# Patient Record
Sex: FEMALE | Race: WHITE | NOT HISPANIC OR LATINO | URBAN - METROPOLITAN AREA
[De-identification: names, ages, dates, MRNs, and addresses within clinical notes are randomized per-mention and may not be internally consistent; named-entity substitution may affect disease eponyms.]

---

## 2017-01-23 ENCOUNTER — OUTPATIENT (OUTPATIENT)
Dept: OUTPATIENT SERVICES | Facility: HOSPITAL | Age: 37
LOS: 1 days | End: 2017-01-23
Payer: COMMERCIAL

## 2017-01-23 PROCEDURE — 88321 CONSLTJ&REPRT SLD PREP ELSWR: CPT

## 2017-01-24 DIAGNOSIS — Z71.9 COUNSELING, UNSPECIFIED: ICD-10-CM

## 2017-01-24 LAB — PATHOLOGY CONSULT NOTE: SIGNIFICANT CHANGE UP

## 2017-12-11 ENCOUNTER — APPOINTMENT (OUTPATIENT)
Dept: DERMATOLOGY | Facility: CLINIC | Age: 37
End: 2017-12-11
Payer: COMMERCIAL

## 2017-12-11 PROCEDURE — 99203 OFFICE O/P NEW LOW 30 MIN: CPT

## 2017-12-23 ENCOUNTER — TRANSCRIPTION ENCOUNTER (OUTPATIENT)
Age: 37
End: 2017-12-23

## 2017-12-29 ENCOUNTER — TRANSCRIPTION ENCOUNTER (OUTPATIENT)
Age: 37
End: 2017-12-29

## 2018-12-12 ENCOUNTER — RESULT REVIEW (OUTPATIENT)
Age: 38
End: 2018-12-12

## 2018-12-12 ENCOUNTER — APPOINTMENT (OUTPATIENT)
Dept: DERMATOLOGY | Facility: CLINIC | Age: 38
End: 2018-12-12
Payer: COMMERCIAL

## 2018-12-12 PROCEDURE — 11100 BX SKIN SUBCUTANEOUS&/MUCOUS MEMBRANE 1 LESION: CPT

## 2018-12-12 PROCEDURE — 99213 OFFICE O/P EST LOW 20 MIN: CPT | Mod: 25

## 2019-10-17 ENCOUNTER — APPOINTMENT (OUTPATIENT)
Dept: PEDIATRIC CARDIOLOGY | Facility: CLINIC | Age: 39
End: 2019-10-17
Payer: COMMERCIAL

## 2019-10-17 PROCEDURE — 76820 UMBILICAL ARTERY ECHO: CPT

## 2019-10-17 PROCEDURE — 99203 OFFICE O/P NEW LOW 30 MIN: CPT | Mod: 25

## 2019-10-17 PROCEDURE — 93325 DOPPLER ECHO COLOR FLOW MAPG: CPT | Mod: 59

## 2019-10-17 PROCEDURE — 76825 ECHO EXAM OF FETAL HEART: CPT

## 2019-10-17 PROCEDURE — 76821 MIDDLE CEREBRAL ARTERY ECHO: CPT

## 2019-10-17 PROCEDURE — 76827 ECHO EXAM OF FETAL HEART: CPT

## 2019-12-11 ENCOUNTER — APPOINTMENT (OUTPATIENT)
Dept: DERMATOLOGY | Facility: CLINIC | Age: 39
End: 2019-12-11
Payer: COMMERCIAL

## 2019-12-11 PROCEDURE — 99214 OFFICE O/P EST MOD 30 MIN: CPT

## 2020-01-14 ENCOUNTER — OUTPATIENT (OUTPATIENT)
Dept: OUTPATIENT SERVICES | Facility: HOSPITAL | Age: 40
LOS: 1 days | End: 2020-01-14
Payer: COMMERCIAL

## 2020-01-14 VITALS
WEIGHT: 136.69 LBS | HEART RATE: 72 BPM | RESPIRATION RATE: 20 BRPM | HEIGHT: 71 IN | SYSTOLIC BLOOD PRESSURE: 90 MMHG | DIASTOLIC BLOOD PRESSURE: 54 MMHG | TEMPERATURE: 98 F

## 2020-01-14 DIAGNOSIS — Z01.818 ENCOUNTER FOR OTHER PREPROCEDURAL EXAMINATION: ICD-10-CM

## 2020-01-14 LAB
APPEARANCE UR: CLEAR — SIGNIFICANT CHANGE UP
BACTERIA # UR AUTO: ABNORMAL
BASOPHILS # BLD AUTO: 0.03 K/UL — SIGNIFICANT CHANGE UP (ref 0–0.2)
BASOPHILS NFR BLD AUTO: 0.3 % — SIGNIFICANT CHANGE UP (ref 0–2)
BILIRUB UR-MCNC: NEGATIVE — SIGNIFICANT CHANGE UP
BLD GP AB SCN SERPL QL: SIGNIFICANT CHANGE UP
COLOR SPEC: YELLOW — SIGNIFICANT CHANGE UP
DIFF PNL FLD: NEGATIVE — SIGNIFICANT CHANGE UP
EOSINOPHIL # BLD AUTO: 0.05 K/UL — SIGNIFICANT CHANGE UP (ref 0–0.5)
EOSINOPHIL NFR BLD AUTO: 0.5 % — SIGNIFICANT CHANGE UP (ref 0–6)
EPI CELLS # UR: ABNORMAL
GLUCOSE UR QL: 50 MG/DL
HCT VFR BLD CALC: 35.4 % — SIGNIFICANT CHANGE UP (ref 34.5–45)
HGB BLD-MCNC: 11.6 G/DL — SIGNIFICANT CHANGE UP (ref 11.5–15.5)
IMM GRANULOCYTES NFR BLD AUTO: 0.6 % — SIGNIFICANT CHANGE UP (ref 0–1.5)
KETONES UR-MCNC: NEGATIVE — SIGNIFICANT CHANGE UP
LEUKOCYTE ESTERASE UR-ACNC: ABNORMAL
LYMPHOCYTES # BLD AUTO: 1.23 K/UL — SIGNIFICANT CHANGE UP (ref 1–3.3)
LYMPHOCYTES # BLD AUTO: 12.5 % — LOW (ref 13–44)
MCHC RBC-ENTMCNC: 30.6 PG — SIGNIFICANT CHANGE UP (ref 27–34)
MCHC RBC-ENTMCNC: 32.8 GM/DL — SIGNIFICANT CHANGE UP (ref 32–36)
MCV RBC AUTO: 93.4 FL — SIGNIFICANT CHANGE UP (ref 80–100)
MONOCYTES # BLD AUTO: 0.71 K/UL — SIGNIFICANT CHANGE UP (ref 0–0.9)
MONOCYTES NFR BLD AUTO: 7.2 % — SIGNIFICANT CHANGE UP (ref 2–14)
NEUTROPHILS # BLD AUTO: 7.74 K/UL — HIGH (ref 1.8–7.4)
NEUTROPHILS NFR BLD AUTO: 78.9 % — HIGH (ref 43–77)
NITRITE UR-MCNC: NEGATIVE — SIGNIFICANT CHANGE UP
PH UR: 6.5 — SIGNIFICANT CHANGE UP (ref 5–8)
PLATELET # BLD AUTO: 230 K/UL — SIGNIFICANT CHANGE UP (ref 150–400)
PROT UR-MCNC: 15 MG/DL
RBC # BLD: 3.79 M/UL — LOW (ref 3.8–5.2)
RBC # FLD: 12.1 % — SIGNIFICANT CHANGE UP (ref 10.3–14.5)
RBC CASTS # UR COMP ASSIST: SIGNIFICANT CHANGE UP /HPF (ref 0–4)
SP GR SPEC: 1.02 — SIGNIFICANT CHANGE UP (ref 1.01–1.02)
UROBILINOGEN FLD QL: NEGATIVE MG/DL — SIGNIFICANT CHANGE UP
WBC # BLD: 9.82 K/UL — SIGNIFICANT CHANGE UP (ref 3.8–10.5)
WBC # FLD AUTO: 9.82 K/UL — SIGNIFICANT CHANGE UP (ref 3.8–10.5)
WBC UR QL: SIGNIFICANT CHANGE UP

## 2020-01-14 PROCEDURE — G0463: CPT

## 2020-01-15 LAB — T PALLIDUM AB TITR SER: NEGATIVE — SIGNIFICANT CHANGE UP

## 2020-01-25 ENCOUNTER — TRANSCRIPTION ENCOUNTER (OUTPATIENT)
Age: 40
End: 2020-01-25

## 2020-01-25 ENCOUNTER — RESULT REVIEW (OUTPATIENT)
Age: 40
End: 2020-01-25

## 2020-01-25 RX ORDER — OXYTOCIN 10 UNIT/ML
333.33 VIAL (ML) INJECTION
Qty: 20 | Refills: 0 | Status: DISCONTINUED | OUTPATIENT
Start: 2020-01-26 | End: 2020-01-26

## 2020-01-25 RX ORDER — METOCLOPRAMIDE HCL 10 MG
10 TABLET ORAL ONCE
Refills: 0 | Status: DISCONTINUED | OUTPATIENT
Start: 2020-01-26 | End: 2020-01-26

## 2020-01-25 RX ORDER — SODIUM CHLORIDE 9 MG/ML
1000 INJECTION, SOLUTION INTRAVENOUS
Refills: 0 | Status: DISCONTINUED | OUTPATIENT
Start: 2020-01-26 | End: 2020-01-26

## 2020-01-25 NOTE — OB PROVIDER H&P - NSHPPHYSICALEXAM_GEN_ALL_CORE
ICU Vital Signs Last 24 Hrs  T(C): 36.8 (26 Jan 2020 08:03), Max: 36.8 (26 Jan 2020 08:01)  T(F): 98.2 (26 Jan 2020 08:03), Max: 98.24 (26 Jan 2020 08:01)  HR: 65 (26 Jan 2020 08:03) (65 - 65)  BP: 115/74 (26 Jan 2020 08:03) (115/74 - 115/74)  RR: 16 (26 Jan 2020 08:03) (16 - 16)          General: AOx3, NAD  Heart: RRR  Lungs: CTAB  Abd: Soft, nontender, gravid    BMI (kg/m2): 23.4 (01-26-20 @ 08:03)        FHT: 140 bpm, moderate variability + accels no decelerations   Railroad: Ctxs every 6-10 minutes.     Sono: Breech

## 2020-01-25 NOTE — OB PROVIDER H&P - ASSESSMENT
37yo  at 39w3d c/w LMP who presents to L&D for a primary CS for breech presentation.   - consent  - admission labs  - preop antibiotics     d/w Dr. Joseph SUSAN GREENBERG is a 39y  at 39w3d c/w LMP who presents to L&D for a primary CS for breech presentation.     PLAN:  - Consent  - Admission labs  - GBS Negative  - Breech presentation  - Pre-operative antibiotics ordered  - Continuous toco/FHT  - Maternal/fetal status reassuring  - Admit to L&D    D/w Dr. Joseph

## 2020-01-25 NOTE — OB PROVIDER H&P - HISTORY OF PRESENT ILLNESS
37yo  at 39w3d c/w LMP who presents to L&D for a primary CS for breech presentation  AINSLEY: 20  LMP: 19  Prenatal course complicated by: AMA    OBHx:   G1: 10/18 [id number]   PMH: ***  PSH: laparoscopy   Meds: ***  ALL: nkda 39yo  at 39w3d c/w LMP who presents to L&D for a primary CS for breech presentation    No acute complaints at this time. Reports good fetal movement. Denies any vaginal bleeding or contraction like pain    AINSLEY: 20  LMP: 19  Prenatal course complicated by:   -AMA  -Breech presentation  -Marginal cord insertion    OBHx:   - G1: 10/18 [id number] -   PMH: Dermoid cyst, Fibroid  PSH: Exploratory laparoscopy   Meds: PNV  ALL: nkda

## 2020-01-26 ENCOUNTER — RESULT REVIEW (OUTPATIENT)
Age: 40
End: 2020-01-26

## 2020-01-26 ENCOUNTER — TRANSCRIPTION ENCOUNTER (OUTPATIENT)
Age: 40
End: 2020-01-26

## 2020-01-26 ENCOUNTER — INPATIENT (INPATIENT)
Facility: HOSPITAL | Age: 40
LOS: 1 days | Discharge: HOME | End: 2020-01-28
Attending: OBSTETRICS & GYNECOLOGY | Admitting: OBSTETRICS & GYNECOLOGY
Payer: COMMERCIAL

## 2020-01-26 VITALS — DIASTOLIC BLOOD PRESSURE: 74 MMHG | SYSTOLIC BLOOD PRESSURE: 115 MMHG | HEART RATE: 65 BPM

## 2020-01-26 LAB
BLD GP AB SCN SERPL QL: SIGNIFICANT CHANGE UP
T PALLIDUM AB TITR SER: NEGATIVE — SIGNIFICANT CHANGE UP

## 2020-01-26 PROCEDURE — 88307 TISSUE EXAM BY PATHOLOGIST: CPT | Mod: 26

## 2020-01-26 PROCEDURE — 88112 CYTOPATH CELL ENHANCE TECH: CPT | Mod: 26

## 2020-01-26 PROCEDURE — 88302 TISSUE EXAM BY PATHOLOGIST: CPT | Mod: 26

## 2020-01-26 RX ORDER — CITRIC ACID/SODIUM CITRATE 300-500 MG
30 SOLUTION, ORAL ORAL ONCE
Refills: 0 | Status: COMPLETED | OUTPATIENT
Start: 2020-01-26 | End: 2020-01-26

## 2020-01-26 RX ORDER — ONDANSETRON 8 MG/1
4 TABLET, FILM COATED ORAL EVERY 6 HOURS
Refills: 0 | Status: DISCONTINUED | OUTPATIENT
Start: 2020-01-26 | End: 2020-01-28

## 2020-01-26 RX ORDER — MAGNESIUM HYDROXIDE 400 MG/1
30 TABLET, CHEWABLE ORAL
Refills: 0 | Status: DISCONTINUED | OUTPATIENT
Start: 2020-01-26 | End: 2020-01-28

## 2020-01-26 RX ORDER — OXYCODONE HYDROCHLORIDE 5 MG/1
5 TABLET ORAL
Refills: 0 | Status: COMPLETED | OUTPATIENT
Start: 2020-01-26 | End: 2020-02-02

## 2020-01-26 RX ORDER — SODIUM CHLORIDE 9 MG/ML
1000 INJECTION, SOLUTION INTRAVENOUS
Refills: 0 | Status: DISCONTINUED | OUTPATIENT
Start: 2020-01-26 | End: 2020-01-28

## 2020-01-26 RX ORDER — TETANUS TOXOID, REDUCED DIPHTHERIA TOXOID AND ACELLULAR PERTUSSIS VACCINE, ADSORBED 5; 2.5; 8; 8; 2.5 [IU]/.5ML; [IU]/.5ML; UG/.5ML; UG/.5ML; UG/.5ML
0.5 SUSPENSION INTRAMUSCULAR ONCE
Refills: 0 | Status: DISCONTINUED | OUTPATIENT
Start: 2020-01-26 | End: 2020-01-28

## 2020-01-26 RX ORDER — GLYCERIN ADULT
1 SUPPOSITORY, RECTAL RECTAL AT BEDTIME
Refills: 0 | Status: COMPLETED | OUTPATIENT
Start: 2020-01-26 | End: 2020-01-27

## 2020-01-26 RX ORDER — OXYTOCIN 10 UNIT/ML
333.33 VIAL (ML) INJECTION
Qty: 20 | Refills: 0 | Status: DISCONTINUED | OUTPATIENT
Start: 2020-01-26 | End: 2020-01-28

## 2020-01-26 RX ORDER — FAMOTIDINE 10 MG/ML
20 INJECTION INTRAVENOUS ONCE
Refills: 0 | Status: COMPLETED | OUTPATIENT
Start: 2020-01-26 | End: 2020-01-26

## 2020-01-26 RX ORDER — KETOROLAC TROMETHAMINE 30 MG/ML
30 SYRINGE (ML) INJECTION EVERY 6 HOURS
Refills: 0 | Status: DISCONTINUED | OUTPATIENT
Start: 2020-01-26 | End: 2020-01-27

## 2020-01-26 RX ORDER — IBUPROFEN 200 MG
600 TABLET ORAL EVERY 6 HOURS
Refills: 0 | Status: COMPLETED | OUTPATIENT
Start: 2020-01-26 | End: 2020-12-24

## 2020-01-26 RX ORDER — SODIUM CHLORIDE 9 MG/ML
1000 INJECTION, SOLUTION INTRAVENOUS ONCE
Refills: 0 | Status: COMPLETED | OUTPATIENT
Start: 2020-01-26 | End: 2020-01-26

## 2020-01-26 RX ORDER — LANOLIN
1 OINTMENT (GRAM) TOPICAL EVERY 6 HOURS
Refills: 0 | Status: DISCONTINUED | OUTPATIENT
Start: 2020-01-26 | End: 2020-01-28

## 2020-01-26 RX ORDER — ACETAMINOPHEN 500 MG
975 TABLET ORAL
Refills: 0 | Status: DISCONTINUED | OUTPATIENT
Start: 2020-01-26 | End: 2020-01-28

## 2020-01-26 RX ORDER — DIPHENHYDRAMINE HCL 50 MG
25 CAPSULE ORAL EVERY 6 HOURS
Refills: 0 | Status: DISCONTINUED | OUTPATIENT
Start: 2020-01-26 | End: 2020-01-28

## 2020-01-26 RX ORDER — SIMETHICONE 80 MG/1
80 TABLET, CHEWABLE ORAL EVERY 4 HOURS
Refills: 0 | Status: DISCONTINUED | OUTPATIENT
Start: 2020-01-26 | End: 2020-01-28

## 2020-01-26 RX ORDER — ACETAMINOPHEN 500 MG
1000 TABLET ORAL ONCE
Refills: 0 | Status: COMPLETED | OUTPATIENT
Start: 2020-01-26 | End: 2020-01-26

## 2020-01-26 RX ORDER — CEFAZOLIN SODIUM 1 G
2000 VIAL (EA) INJECTION ONCE
Refills: 0 | Status: COMPLETED | OUTPATIENT
Start: 2020-01-26 | End: 2020-01-26

## 2020-01-26 RX ORDER — OXYCODONE HYDROCHLORIDE 5 MG/1
5 TABLET ORAL ONCE
Refills: 0 | Status: DISCONTINUED | OUTPATIENT
Start: 2020-01-26 | End: 2020-01-28

## 2020-01-26 RX ADMIN — Medication 30 MILLILITER(S): at 09:31

## 2020-01-26 RX ADMIN — Medication 30 MILLIGRAM(S): at 19:06

## 2020-01-26 RX ADMIN — Medication 30 MILLIGRAM(S): at 13:15

## 2020-01-26 RX ADMIN — Medication 975 MILLIGRAM(S): at 22:34

## 2020-01-26 RX ADMIN — SODIUM CHLORIDE 125 MILLILITER(S): 9 INJECTION, SOLUTION INTRAVENOUS at 09:29

## 2020-01-26 RX ADMIN — SODIUM CHLORIDE 2000 MILLILITER(S): 9 INJECTION, SOLUTION INTRAVENOUS at 08:25

## 2020-01-26 RX ADMIN — FAMOTIDINE 20 MILLIGRAM(S): 10 INJECTION INTRAVENOUS at 09:32

## 2020-01-26 RX ADMIN — Medication 30 MILLIGRAM(S): at 13:00

## 2020-01-26 RX ADMIN — Medication 100 MILLIGRAM(S): at 10:05

## 2020-01-26 RX ADMIN — Medication 975 MILLIGRAM(S): at 23:30

## 2020-01-26 RX ADMIN — Medication 1000 MILLIUNIT(S)/MIN: at 10:26

## 2020-01-26 RX ADMIN — Medication 1000 MILLIGRAM(S): at 14:00

## 2020-01-26 RX ADMIN — Medication 400 MILLIGRAM(S): at 13:30

## 2020-01-26 RX ADMIN — Medication 30 MILLIGRAM(S): at 19:26

## 2020-01-26 NOTE — DISCHARGE NOTE OB - CARE PROVIDER_API CALL
Mary Beth Joseph)  Obstetrics and Gynecology  [address] [place] [address]  Phone: [phone]  Fax: [phone]  Follow Up Time:

## 2020-01-26 NOTE — DISCHARGE NOTE OB - PLAN OF CARE
Healthy mother and baby 1) Please take ibuprofen and other prescribed medications as needed for pain.  2) Nothing in the vagina for 6 weeks (including no sex, no tampons, and no douching).  3) No tub baths or pools for 2 weeks. Showers are okay. Please keep your incision dry until your follow up appointment.  4) Please call your doctor for a follow up appointment  5) Please call the office sooner if you have heavy vaginal bleeding, severe abdominal pain, or fever over 100.4F.

## 2020-01-26 NOTE — OB PROVIDER DELIVERY SUMMARY - NSPROVIDERDELIVERYNOTE_OBGYN_ALL_OB_FT
39yyo  @ 39weeks presenting with primary  section for breech presentation    Patient was taken to the OR, a primary low transverse  section was performed and a viable female infant was delivered atraumatically in sky presentation at 10:25. Nuchal cord x 0. Placenta delivered Intact,    Left 2cm ovarian cyst was noted and incision with scalpel. Left fallopian tube was grasped with babock and ligated with ligasure. Right fallopian tube ligated in a similar manner.     Hysterotomy, rectus muscles, fascia, and skin were reapproximated with suture. Excellent hemostasis was obtained at each layer of closure.  Apgars 9/9  EBL 500cc 39yyo  @ 39weeks presenting with primary  section for breech presentation    Patient was taken to the OR, a primary low transverse  section was performed and a viable female infant was delivered atraumatically in sky breech presentation at 10:25. Nuchal cord x 0. Placenta delivered Intact at 10:26. Hysterotomy was repaired in a running locked fashion    Left 2cm ovarian cyst was noted and incised with scalpel. Scissors were used to develop the plan between the cyst wall and the ovarian cortex to shell out the cyst. Cyst was removed, and ovarian cortex was repaired in a running fashion. Left fallopian tube was grasped with Lanie and ligated with ligasure. Right fallopian tube ligated in a similar manner.     Rectus muscles, fascia, and skin were reapproximated with suture. Excellent hemostasis was obtained at each layer of closure.  Apgars 9/9  EBL 500cc

## 2020-01-26 NOTE — DISCHARGE NOTE OB - PATIENT PORTAL LINK FT
You can access the FollowMyHealth Patient Portal offered by [facility] by registering at the following website: [email or web address]. By joining [facility]’s FollowMyHealth portal, you will also be able to view your health information using other applications (apps) compatible with our system.

## 2020-01-26 NOTE — DISCHARGE NOTE OB - HOSPITAL COURSE
POD#3 s/p rCS of a viable female infant. Patient transferred to post partum unit, uncomplicated hospital course. At the time of discharge patient was tolerating regular diet PO, ambulating, voiding, and demonstrating bowel function. Pain well controlled with pain medications PRN.

## 2020-01-26 NOTE — DISCHARGE NOTE OB - MEDICATION SUMMARY - MEDICATIONS TO TAKE
I will START or STAY ON the medications listed below when I get home from the hospital:    ibuprofen 600 mg oral tablet  -- 1 tab(s) by mouth every 6 hours, As Needed -for mild pain   -- Do not take this drug if you are pregnant.  It is very important that you take or use this exactly as directed.  Do not skip doses or discontinue unless directed by your doctor.  May cause drowsiness or dizziness.  Obtain medical advice before taking any non-prescription drugs as some may affect the action of this medication.  Take with food or milk.    -- Indication: For  delivery delivered

## 2020-01-26 NOTE — OB NEONATOLOGY/PEDIATRICIAN DELIVERY SUMMARY - NSPEDSNEONOTESA_OBGYN_ALL_OB_FT
Requested by DR. Joseph to attend a scheduled P C/S  due to breech presentation of a 38 y/o  mom at 39.3 weeks GA.  She had + PNC, is O pos, HIV neg, HBsAg neg, RPR NR, Rubella Imm, GBS neg.  L&D:  AROM at delivery.  Baby born breech presentation good cry and respiratory effort. Transfer to warmer dry and stimulated. Physical exam done showed to FOB. Full tern female BW  g AGA. To mother for STS then to  RN for transition under Hospitalist. Requested by DR. Joseph to attend a scheduled P C/S  due to breech presentation of a 40 y/o  mom at 39.3 weeks GA.  She had + PNC, is O pos, HIV neg, HBsAg neg, RPR NR, Rubella Imm, GBS neg.  L&D:  AROM at delivery.  Baby born sky breech presentation good cry and respiratory effort. Transfer to warmer dry and stimulated. Physical exam done showed to FOB. Full tern female BW 3690  g AGA. To mother for STS then to  RN for transition under Hospitalist. Requested by DR. Joseph to attend a scheduled P C/S  due to breech presentation of a 40 y/o  mom at 39.3 weeks GA.  She had + PNC, is O pos, HIV neg, HBsAg neg, RPR NR, Rubella Imm, GBS neg.  L&D:  AROM at delivery.  Baby born sky breech presentation good cry and respiratory effort. Transfer to warmer dry and stimulated. Physical exam done showed to FOB. Full tern female BW 3690  g AGA. To mother for STS then to  RN for transition under Hospitalist. Needs HIP sonogram at 4-6 wks.

## 2020-01-26 NOTE — DISCHARGE NOTE OB - MEDICATION SUMMARY - MEDICATIONS TO STOP TAKING
I will STOP taking the medications listed below when I get home from the hospital:    motrin  -- 600 milligram(s) by mouth every 6 hours, As Needed -for moderate pain MDD:4 tabs    Percocet 5/325 325 mg-5 mg oral tablet  -- 1 tab(s) by mouth every 6 hours, As Needed -for severe pain MDD:4 tabs  -- Caution federal law prohibits the transfer of this drug to any person other  than the person for whom it was prescribed.  May cause drowsiness.  Alcohol may intensify this effect.  Use care when operating dangerous machinery.  This prescription cannot be refilled.  This product contains acetaminophen.  Do not use  with any other product containing acetaminophen to prevent possible liver damage.  Using more of this medication than prescribed may cause serious breathing problems.    prenatal vitamin  -- once a day    Folic acid  -- once a day    Vitamin D  -- once a day

## 2020-01-26 NOTE — OB RN DELIVERY SUMMARY - NS_SEPSISRSKCALC_OBGYN_ALL_OB_FT
EOS calculated successfully. EOS Risk Factor: 0.5/1000 live births ([facility] national incidence); GA=39w3d; Temp=98.24; ROM=0.033; GBS='Negative'; Antibiotics='No antibiotics or any antibiotics < 2 hrs prior to birth'

## 2020-01-26 NOTE — DISCHARGE NOTE OB - CARE PLAN
Dr. Hughes Principal Discharge DX:	 delivery delivered  Goal:	Healthy mother and baby  Assessment and plan of treatment:	1) Please take ibuprofen and other prescribed medications as needed for pain.  2) Nothing in the vagina for 6 weeks (including no sex, no tampons, and no douching).  3) No tub baths or pools for 2 weeks. Showers are okay. Please keep your incision dry until your follow up appointment.  4) Please call your doctor for a follow up appointment  5) Please call the office sooner if you have heavy vaginal bleeding, severe abdominal pain, or fever over 100.4F.

## 2020-01-27 LAB
BASOPHILS # BLD AUTO: 0.03 K/UL — SIGNIFICANT CHANGE UP (ref 0–0.2)
BASOPHILS NFR BLD AUTO: 0.3 % — SIGNIFICANT CHANGE UP (ref 0–2)
EOSINOPHIL # BLD AUTO: 0.09 K/UL — SIGNIFICANT CHANGE UP (ref 0–0.5)
EOSINOPHIL NFR BLD AUTO: 0.8 % — SIGNIFICANT CHANGE UP (ref 0–6)
HCT VFR BLD CALC: 29.1 % — LOW (ref 34.5–45)
HGB BLD-MCNC: 9.3 G/DL — LOW (ref 11.5–15.5)
IMM GRANULOCYTES NFR BLD AUTO: 0.5 % — SIGNIFICANT CHANGE UP (ref 0–1.5)
LYMPHOCYTES # BLD AUTO: 1.63 K/UL — SIGNIFICANT CHANGE UP (ref 1–3.3)
LYMPHOCYTES # BLD AUTO: 15.3 % — SIGNIFICANT CHANGE UP (ref 13–44)
MCHC RBC-ENTMCNC: 29.4 PG — SIGNIFICANT CHANGE UP (ref 27–34)
MCHC RBC-ENTMCNC: 32 GM/DL — SIGNIFICANT CHANGE UP (ref 32–36)
MCV RBC AUTO: 92.1 FL — SIGNIFICANT CHANGE UP (ref 80–100)
MONOCYTES # BLD AUTO: 0.97 K/UL — HIGH (ref 0–0.9)
MONOCYTES NFR BLD AUTO: 9.1 % — SIGNIFICANT CHANGE UP (ref 2–14)
NEUTROPHILS # BLD AUTO: 7.88 K/UL — HIGH (ref 1.8–7.4)
NEUTROPHILS NFR BLD AUTO: 74 % — SIGNIFICANT CHANGE UP (ref 43–77)
PLATELET # BLD AUTO: 192 K/UL — SIGNIFICANT CHANGE UP (ref 150–400)
RBC # BLD: 3.16 M/UL — LOW (ref 3.8–5.2)
RBC # FLD: 12.2 % — SIGNIFICANT CHANGE UP (ref 10.3–14.5)
WBC # BLD: 10.65 K/UL — HIGH (ref 3.8–10.5)
WBC # FLD AUTO: 10.65 K/UL — HIGH (ref 3.8–10.5)

## 2020-01-27 RX ORDER — IBUPROFEN 200 MG
600 TABLET ORAL EVERY 6 HOURS
Refills: 0 | Status: DISCONTINUED | OUTPATIENT
Start: 2020-01-27 | End: 2020-01-28

## 2020-01-27 RX ADMIN — Medication 975 MILLIGRAM(S): at 16:20

## 2020-01-27 RX ADMIN — Medication 30 MILLIGRAM(S): at 00:33

## 2020-01-27 RX ADMIN — Medication 975 MILLIGRAM(S): at 10:00

## 2020-01-27 RX ADMIN — Medication 600 MILLIGRAM(S): at 12:32

## 2020-01-27 RX ADMIN — Medication 975 MILLIGRAM(S): at 05:05

## 2020-01-27 RX ADMIN — Medication 975 MILLIGRAM(S): at 09:11

## 2020-01-27 RX ADMIN — Medication 30 MILLIGRAM(S): at 06:27

## 2020-01-27 RX ADMIN — Medication 975 MILLIGRAM(S): at 04:35

## 2020-01-27 RX ADMIN — Medication 975 MILLIGRAM(S): at 15:45

## 2020-01-27 RX ADMIN — Medication 600 MILLIGRAM(S): at 13:10

## 2020-01-27 RX ADMIN — Medication 30 MILLIGRAM(S): at 06:12

## 2020-01-27 RX ADMIN — Medication 30 MILLIGRAM(S): at 00:48

## 2020-01-27 RX ADMIN — Medication 600 MILLIGRAM(S): at 23:49

## 2020-01-27 RX ADMIN — Medication 600 MILLIGRAM(S): at 19:03

## 2020-01-27 RX ADMIN — Medication 975 MILLIGRAM(S): at 21:23

## 2020-01-27 RX ADMIN — Medication 1 SUPPOSITORY(S): at 21:22

## 2020-01-27 RX ADMIN — Medication 600 MILLIGRAM(S): at 18:22

## 2020-01-27 RX ADMIN — Medication 975 MILLIGRAM(S): at 22:00

## 2020-01-27 NOTE — PROGRESS NOTE ADULT - ASSESSMENT
ASSESSMENT  SUSAN GREENBERG is a 39y  who is post-op day #1 who delivered a female infant at 39w3d by primary  delivery for breech presentation.  No acute events.     PLAN  1. Routine post-op care  - Continue to encourage ambulation, PO intake and breastfeeding  - DVT prophylaxis: lovenox, SCDs  - Continue pain management PRN  - Plan to discharge post-op day 3  - Dressing removed

## 2020-01-27 NOTE — PROGRESS NOTE ADULT - SUBJECTIVE AND OBJECTIVE BOX
Delivery Progress Note    SUSAN GREENBERG is a 39y  who is post-op day #1 who delivered a female infant at 39w3d by primary  delivery for breech presentation.     SUBJECTIVE:  No acute events overnight. Pain is well controlled with PRN pain medication. No problems with ambulating, or PO intake. Has not had flatus or BM. Denies nausea and vomiting. Patient is having appropriate lochia which is decreasing.    She is breastfeeding and the baby is latching on.     OBJECTIVE:  Physical exam:  Vital Signs Last 24 Hrs  T(C): 36.8 (2020 04:14), Max: 37 (2020 00:50)  T(F): 98.3 (2020 04:14), Max: 98.6 (2020 00:50)  HR: 71 (2020 04:14) (54 - 82)  BP: 118/64 (2020 04:14) (102/56 - 120/76)  RR: 18 (2020 04:14) (14 - 18)  SpO2: 99% (2020 04:14) (97% - 100%)  General: alert and oriented x3, no acute distress.  Heart: regular rate and rhythm, no murmurs, rubs or gallops appreciated.  Lungs: clear to auscultation bilaterally.   breast: soft, no tenderness to palpation.  Abdomen: Soft, appropriately tender to palpitation, firm uterine fundus at umbilicus. Incision appears dry and intact with steri strips  Extremities: no tenderness, redness or swelling in lower extremities bilaterally.     Labs:

## 2020-01-27 NOTE — PROGRESS NOTE ADULT - SUBJECTIVE AND OBJECTIVE BOX
POD # 1    Patient resting comfortably in NAD  Afebrile  VSS  Abdomen  Soft  Not tender  Incision  C / D / I   Extrem  No Homans  Lochia  nl    WBC 10.6  H/H  9.3/29.1  Platelet 192  O ++  VDRL Neg    Patient s/p c/s, B. Tubal excision, Left Ovarian cystectomy    Patient stable and doing well  Continue post op care

## 2020-01-28 LAB
HBV SURFACE AG SERPL QL IA: SIGNIFICANT CHANGE UP
NON-GYNECOLOGICAL CYTOLOGY STUDY: SIGNIFICANT CHANGE UP

## 2020-01-28 RX ORDER — OXYCODONE HYDROCHLORIDE 5 MG/1
5 TABLET ORAL
Refills: 0 | Status: DISCONTINUED | OUTPATIENT
Start: 2020-01-28 | End: 2020-01-28

## 2020-01-28 RX ORDER — FOLIC ACID 0.8 MG
0 TABLET ORAL
Qty: 0 | Refills: 0 | DISCHARGE

## 2020-01-28 RX ORDER — IBUPROFEN 200 MG
1 TABLET ORAL
Qty: 28 | Refills: 0
Start: 2020-01-28 | End: 2020-02-03

## 2020-01-28 RX ORDER — SENNA PLUS 8.6 MG/1
2 TABLET ORAL AT BEDTIME
Refills: 0 | Status: DISCONTINUED | OUTPATIENT
Start: 2020-01-28 | End: 2020-01-28

## 2020-01-28 RX ADMIN — Medication 600 MILLIGRAM(S): at 18:38

## 2020-01-28 RX ADMIN — Medication 600 MILLIGRAM(S): at 00:30

## 2020-01-28 RX ADMIN — Medication 975 MILLIGRAM(S): at 15:14

## 2020-01-28 RX ADMIN — Medication 600 MILLIGRAM(S): at 17:56

## 2020-01-28 RX ADMIN — SENNA PLUS 2 TABLET(S): 8.6 TABLET ORAL at 21:19

## 2020-01-28 RX ADMIN — Medication 975 MILLIGRAM(S): at 16:12

## 2020-01-28 RX ADMIN — Medication 975 MILLIGRAM(S): at 22:00

## 2020-01-28 RX ADMIN — Medication 600 MILLIGRAM(S): at 13:40

## 2020-01-28 RX ADMIN — Medication 975 MILLIGRAM(S): at 08:57

## 2020-01-28 RX ADMIN — Medication 975 MILLIGRAM(S): at 21:19

## 2020-01-28 RX ADMIN — Medication 600 MILLIGRAM(S): at 12:40

## 2020-01-28 RX ADMIN — Medication 5 MILLIGRAM(S): at 15:14

## 2020-01-28 RX ADMIN — Medication 975 MILLIGRAM(S): at 09:50

## 2020-01-28 NOTE — PROGRESS NOTE ADULT - SUBJECTIVE AND OBJECTIVE BOX
39y  s/p primary  for breech presentation at 39.3 wks gestation POD#2.   Patient seen and examined at bedside, no acute overnight events.   Patient is ambulating, +eating, +PO hydration, +voiding, +Flatus, +BM, +Breast feeding only and pain is well controlled.  Denies headache, SOB, fever, chills and calf pain.     Vital Signs Last 24 Hrs  T(F): 98.3 (2020 21:10), Max: 98.3 (2020 21:10)  HR: 88 (2020 21:10) (65 - 88)  BP: 123/56 (2020 21:10) (105/62 - 123/56)  RR: 18 (2020 21:10) (18 - 18)  SpO2: 98% (2020 21:10) (98% - 98%)    Physical Exam:  General: NAD  CVS: RRR, +S1/S2  Lungs: CTAB, no wheeze, rhonchi or rales.   Abdomen: +BS, soft, ND, minimally tender, Fundus firm at level of umbilicus. Suture, clean incision site, healing well.  Pelvic: Minimal lochia  Ext: No cyanosis, edema or calf tenderness.     Labs:                        9.3    10.65 )-----------( 192      ( 2020 06:31 )             29.1

## 2020-01-28 NOTE — PROGRESS NOTE ADULT - ASSESSMENT
39y  who is post-op day #1 who delivered a female infant at 39w3d by primary  delivery for breech presentation.  No acute events.       PLAN  1. Routine post-op care  - Continue to encourage ambulation, PO intake and breastfeeding  - DVT prophylaxis: lovenox, SCDs  - Continue pain management PRN  - Plan to discharge post-op day 3 39y  who is post-op day #2 who delivered a female infant at 39w3d by primary  delivery for breech presentation.  No acute events.       PLAN  1. Routine post-op care  - Continue to encourage ambulation, PO intake and breastfeeding  - DVT prophylaxis: lovenox, SCDs  - Continue pain management PRN  - Plan to discharge post-op day 3

## 2020-01-28 NOTE — PROGRESS NOTE ADULT - SUBJECTIVE AND OBJECTIVE BOX
POD # 2    Patient resting comfortably in NAD  Afebrile VSS  Abdomen  Soft  Not Tender  Incision C / D / I   Extrem  NO Homans  Lochia  Nl    WBC 10.6  H / H 9.3 / 29.1  Platel  192    Flatus ++  Voiding ++    Patient s/p c/s . b. tubal excision , Ovarian Cystectomy    Patient stable and doing well  Continue post op care

## 2020-01-29 VITALS
HEART RATE: 58 BPM | DIASTOLIC BLOOD PRESSURE: 78 MMHG | TEMPERATURE: 98 F | SYSTOLIC BLOOD PRESSURE: 131 MMHG | RESPIRATION RATE: 18 BRPM

## 2020-01-29 PROCEDURE — 59050 FETAL MONITOR W/REPORT: CPT

## 2020-01-29 PROCEDURE — 86780 TREPONEMA PALLIDUM: CPT

## 2020-01-29 PROCEDURE — 87340 HEPATITIS B SURFACE AG IA: CPT

## 2020-01-29 PROCEDURE — 86850 RBC ANTIBODY SCREEN: CPT

## 2020-01-29 PROCEDURE — 86900 BLOOD TYPING SEROLOGIC ABO: CPT

## 2020-01-29 PROCEDURE — 88302 TISSUE EXAM BY PATHOLOGIST: CPT

## 2020-01-29 PROCEDURE — 59025 FETAL NON-STRESS TEST: CPT

## 2020-01-29 PROCEDURE — 86901 BLOOD TYPING SEROLOGIC RH(D): CPT

## 2020-01-29 PROCEDURE — 36415 COLL VENOUS BLD VENIPUNCTURE: CPT

## 2020-01-29 PROCEDURE — 88307 TISSUE EXAM BY PATHOLOGIST: CPT

## 2020-01-29 PROCEDURE — 85027 COMPLETE CBC AUTOMATED: CPT

## 2020-01-29 RX ADMIN — Medication 600 MILLIGRAM(S): at 01:15

## 2020-01-29 RX ADMIN — Medication 600 MILLIGRAM(S): at 00:34

## 2020-01-29 RX ADMIN — Medication 600 MILLIGRAM(S): at 05:53

## 2020-01-29 RX ADMIN — Medication 600 MILLIGRAM(S): at 06:30

## 2020-01-29 NOTE — PROGRESS NOTE ADULT - SUBJECTIVE AND OBJECTIVE BOX
POD # 3    Patient resting comfortably in NAD  Afebrile  VSS  Abdomen soft  not tender  Incision C/ D / I  Extrem. No Homans  Lochia  Nl    Patient s/p c/s, B. Tubal excision , ovarian cystectomy  Patient stable and doing well  DC to home F/U office 2 weeks

## 2020-01-29 NOTE — PROGRESS NOTE ADULT - ASSESSMENT
A/P:  40yo  now POD#3 s/p  section 2/2 to sky breech presentation at 39 3/7 weeks gestation  -Vital Signs Stable  -Voiding, tolerating PO, bowel function nml   -Advance care as tolerated   -Continue routine postpartum/postoperative care and education.  -Healthy female infant  -Stable for discharge home pending attending approval

## 2020-01-29 NOTE — PROGRESS NOTE ADULT - SUBJECTIVE AND OBJECTIVE BOX
SUSAN GREENBERG is a 40yo  now POD#3 s/p  section 2/ to sky breech presentation at 39 3/7 weeks gestation    S:    The patient has no complaints. Pain controlled with medication   She is ambulating without difficulty and tolerating PO.   + flatus/+BM/+ voiding   She desires to go home today    O:    T(C): 36.7 (20 @ 21:21), Max: 36.7 (20 @ 08:17)  HR: 63 (20 @ 21:21) (63 - 84)  BP: 130/77 (20 @ 21:21) (128/74 - 130/77)  RR: 18 (20 @ 21:21) (18 - 18)  SpO2: 98% (20 @ 21:21) (98% - 98%)      Gen: NAD, AOx3  CV: RRR  Pulm: CTAB  Breast: nontender, not engorged   Abdomen:  soft, non-tender, non-distended, +bowel sounds.  Uterus:  Fundus firm below umbilicus  Incision:  Clean/dry/intact with steri-strips in place  VE:  +lochia  Ext:  Non-tender, no edema                           9.3    10.65 )-----------( 192      ( 2020 06:31 )             29.1

## 2020-02-05 LAB — SURGICAL PATHOLOGY STUDY: SIGNIFICANT CHANGE UP

## 2020-05-15 NOTE — OB PST NOTE - BREASTFEEDING PROVIDES STABLE TEMPERATURE THROUGH SKIN TO SKIN CONTACT
my stomach since this afternoon. I went o urgent care and was told to come to ED, but it was fine now its worse. _Pt points to mid abdomin epigastric. denies NVD Statement Selected

## 2020-10-27 NOTE — OB PST NOTE - TEMPERATURE IN CELSIUS (DEGREES C)
POST INJECTION EVALUATION, no signs of new infection, tear, RD, VF, EOM, CNS, Vascular or other problems or side effect from previous injection(s). 36.8

## 2020-12-09 ENCOUNTER — APPOINTMENT (OUTPATIENT)
Dept: DERMATOLOGY | Facility: CLINIC | Age: 40
End: 2020-12-09
Payer: COMMERCIAL

## 2020-12-09 PROBLEM — Z98.890 OTHER SPECIFIED POSTPROCEDURAL STATES: Chronic | Status: ACTIVE | Noted: 2020-01-14

## 2020-12-09 PROCEDURE — 99213 OFFICE O/P EST LOW 20 MIN: CPT

## 2020-12-09 PROCEDURE — 99072 ADDL SUPL MATRL&STAF TM PHE: CPT

## 2021-03-16 NOTE — OB RN PATIENT PROFILE - FALL HARM RISK CONCLUSION
Pt seen for 45min PT Eval. Pt POD#0 Rt TSA, NWB (PROM shld full flex, IR to chest wall, ER to neutral, NO EXT, NO ABD). Pt rec'd semi supine in bed in NAD, +sling, reports feeling numb. Universal Safety Interventions

## 2021-10-28 NOTE — OB RN DELIVERY SUMMARY - NS_LABORCHARACTER_OBGYN_ALL_OB
1st attempt  Unable to lm to cb with % improvement and pain level.   No vm.   External electronic FM/Antibiotics in labor

## 2022-01-04 ENCOUNTER — TRANSCRIPTION ENCOUNTER (OUTPATIENT)
Age: 42
End: 2022-01-04

## 2022-03-03 ENCOUNTER — APPOINTMENT (OUTPATIENT)
Dept: DERMATOLOGY | Facility: CLINIC | Age: 42
End: 2022-03-03
Payer: COMMERCIAL

## 2022-03-03 PROCEDURE — 99213 OFFICE O/P EST LOW 20 MIN: CPT

## 2022-07-13 NOTE — OB RN INTRAOPERATIVE NOTE - NS_INDWELLINGURINARYCATHETERINSERTEDBY_OBGYN_ALL_OB_FT
DR Joseph Medical Necessity Statement: Based on my medical judgement, Mohs surgery is the most appropriate treatment for this cancer compared to other treatments.

## 2022-11-14 DIAGNOSIS — Z00.00 ENCOUNTER FOR GENERAL ADULT MEDICAL EXAMINATION W/OUT ABNORMAL FINDINGS: ICD-10-CM

## 2022-11-22 ENCOUNTER — APPOINTMENT (OUTPATIENT)
Dept: OBGYN | Facility: CLINIC | Age: 42
End: 2022-11-22

## 2022-11-29 ENCOUNTER — APPOINTMENT (OUTPATIENT)
Dept: OBGYN | Facility: CLINIC | Age: 42
End: 2022-11-29

## 2022-11-29 VITALS
HEART RATE: 70 BPM | DIASTOLIC BLOOD PRESSURE: 82 MMHG | WEIGHT: 149 LBS | HEIGHT: 71 IN | SYSTOLIC BLOOD PRESSURE: 122 MMHG | BODY MASS INDEX: 20.86 KG/M2

## 2022-11-29 DIAGNOSIS — U07.1 COVID-19: ICD-10-CM

## 2022-11-29 PROCEDURE — 99396 PREV VISIT EST AGE 40-64: CPT

## 2022-11-29 NOTE — PHYSICAL EXAM
[Chaperone Present] : A chaperone was present in the examining room during all aspects of the physical examination [Appropriately responsive] : appropriately responsive [Alert] : alert [No Acute Distress] : no acute distress [No Lymphadenopathy] : no lymphadenopathy [Regular Rate Rhythm] : regular rate rhythm [No Murmurs] : no murmurs [Clear to Auscultation B/L] : clear to auscultation bilaterally [Soft] : soft [Non-tender] : non-tender [Non-distended] : non-distended [No HSM] : No HSM [No Lesions] : no lesions [No Mass] : no mass [Oriented x3] : oriented x3 [Breast Mass Right Breast ___cm] : no was mass palpable [Labia Majora] : normal [Labia Minora] : normal [Normal] : normal [Anteversion] : anteverted [Uterine Adnexae] : normal [FreeTextEntry6] : No masses, nontender, no skin changes, nipple discharge, no adenopathy. [Tenderness] : nontender [Mass ___ cm] : no uterine mass was palpated

## 2022-11-29 NOTE — PLAN
[FreeTextEntry1] : Patient is a 42-year-old  2 para 2 last menstrual period 2022\par Patient presents for annual visit,,, denies any complaints\par Physical exam reveals a well-developed well-nourished female no apparent distress,,, BMI 21\par Heart regular rhythm and rate, lungs clear, breast no mass nontender no skin or nipple discharge no adenopathy, abdomen soft nontender no organomegaly.\par Pelvic exam shows normal female external genitalia, vagina no lesions, cervix appropriate size nontender, uterus anteverted normal size nontender, adnexa no mass nontender.\par Pap smear performed\par Patient had a breast sonogram performed approximately 2 weeks prior and will forward results\par Patient is scheduled for a follow-up breast mammogram and sonogram in 6 months from now and will be given the appropriate prescription\par Patient states she was diagnosed with COVID back in May of this year ,,, denies any residual symptoms or deficits,,, states she has been both vaccinated boosted\par Essentially benign annual exam\par Follow-up 1 year prior to that as needed

## 2022-12-08 LAB — HPV HIGH+LOW RISK DNA PNL CVX: NOT DETECTED

## 2023-02-23 ENCOUNTER — NON-APPOINTMENT (OUTPATIENT)
Age: 43
End: 2023-02-23

## 2023-03-02 ENCOUNTER — NON-APPOINTMENT (OUTPATIENT)
Age: 43
End: 2023-03-02

## 2023-03-08 ENCOUNTER — APPOINTMENT (OUTPATIENT)
Dept: DERMATOLOGY | Facility: CLINIC | Age: 43
End: 2023-03-08
Payer: COMMERCIAL

## 2023-03-08 PROCEDURE — 99213 OFFICE O/P EST LOW 20 MIN: CPT

## 2023-03-27 ENCOUNTER — APPOINTMENT (OUTPATIENT)
Dept: SURGERY | Facility: CLINIC | Age: 43
End: 2023-03-27
Payer: COMMERCIAL

## 2023-03-27 VITALS
HEART RATE: 61 BPM | BODY MASS INDEX: 20.3 KG/M2 | DIASTOLIC BLOOD PRESSURE: 74 MMHG | WEIGHT: 145 LBS | HEIGHT: 71 IN | OXYGEN SATURATION: 100 % | TEMPERATURE: 97.8 F | SYSTOLIC BLOOD PRESSURE: 109 MMHG

## 2023-03-27 DIAGNOSIS — Z98.890 OTHER SPECIFIED POSTPROCEDURAL STATES: ICD-10-CM

## 2023-03-27 DIAGNOSIS — Z78.9 OTHER SPECIFIED HEALTH STATUS: ICD-10-CM

## 2023-03-27 PROCEDURE — 99203 OFFICE O/P NEW LOW 30 MIN: CPT

## 2023-03-27 NOTE — PHYSICAL EXAM
[Normocephalic] : normocephalic [Atraumatic] : atraumatic [Supple] : supple [No Supraclavicular Adenopathy] : no supraclavicular adenopathy [Examined in the supine and seated position] : examined in the supine and seated position [No dominant masses] : no dominant masses in right breast  [No dominant masses] : no dominant masses left breast [No Nipple Retraction] : no left nipple retraction [No Nipple Discharge] : no left nipple discharge [No Axillary Lymphadenopathy] : no left axillary lymphadenopathy [No Edema] : no edema [No Rashes] : no rashes [No Ulceration] : no ulceration [EOMI] : extra ocular movement intact [PERRL] : pupils equal, round and reactive to light [No Cervical Adenopathy] : no cervical adenopathy [Symmetrical] : symmetrical [Bra Size: ___] : Bra Size: [unfilled]

## 2023-03-27 NOTE — ADDENDUM
[FreeTextEntry1] : This note was written by Breanna Mar, acting as the  for Dr. Jensen. This note accurately reflects the work and decisions made by Dr. Jensen.

## 2023-03-27 NOTE — REVIEW OF SYSTEMS
[Negative] : Heme/Lymph [Breast Pain] : no breast pain [Breast Lump] : no breast lump [Nipple Discharge] : no nipple discharge

## 2023-03-27 NOTE — HISTORY OF PRESENT ILLNESS
[FreeTextEntry1] : Problem List\par 1. Bilateral breast cysts\par     -Right 5:00 6cm FN fibrocystic nodule, increased in size \par     \par \par CARE TEAM\par GYN - Opal\par \par HPI: \par - Patient is a 42 year old female who presents for an initial consultation for abnormal breast imaging. She went for routine screening mammogram/US in May 2022, which demonstrated bilateral breast cysts, for which surveillance imaging was recommended. On repeat imaging, the right breast 5:00 6cm cluster of cysts increased in size. \par \par \par IMAGING:\par 05/11/22 - [facility]: Screening Mammogram - IMPRESSION: No mammographic evidence of malignancy. Recommend routine screening. - BIRADS 2: Benign Finding(s).\par \par 05/11/22 - [facility]: Complete Bilateral Breast Ultrasound - Multiple bilateral breast cysts Including a left breast 10:00 10cm FN 5mm cyst, which is stable in size, but short term followup is reocmmended. IMPRESSION: Stable probably benign finding left breast 10:00. Short-term interval follow-up is recommended in 6 months time to document stability. - BIRADS 3: Probably Benign.\par \par 11/19/22 - [facility]: Complete Bilateral Breast Ultrasound - Right breast 5:00 6cm FN, 1.3cm fibrocystic nodule, increased in size. IMPRESSION: Right breast cystic nodules. No sonographic evidence of malignancy. - BIRADS 2: Benign Finding(s). Recommend US in 1 year

## 2023-03-27 NOTE — PAST MEDICAL HISTORY
[Menstruating] : The patient is menstruating [Menarche Age ____] : age at menarche was [unfilled] [Definite ___ (Date)] : the last menstrual period was [unfilled] [Normal Amount/Duration] : it was of a normal amount and duration [Total Preg ___] : G[unfilled] [Live Births ___] : P[unfilled]  [Living ___] : Living: [unfilled] [Age At Live Birth ___] : Age at live birth: [unfilled] [History of Hormone Replacement Treatment] : has no history of hormone replacement treatment [FreeTextEntry5] : No. [FreeTextEntry6] : Yes, patient used IVF. [FreeTextEntry7] : Yes, for 12 years. [FreeTextEntry8] : No.

## 2023-03-27 NOTE — ASSESSMENT
[FreeTextEntry1] : 42 year old female with bilateral breast cysts, right cystic cluster has increased in size. \par \par \par We discussed the patients diagnosis of cysts and the pathophysiology of the disease process. We discussed that breast cysts are most frequently seen in the premenopausal female but may be present at any age, including the perimenopausal period. We discussed that simple cysts have a negligible risk of cancer and current evidence indicates that a simple cyst does not need to be aspirated unless symptomatic, and no other workup is necessary. Complex cysts have a very low malignancy rate (0.3%) and a 6-month follow-up US is reasonable. She is due for annual mammogram/US in May, We will obtain this now to eval stability of the right breast cystic area. We discussed options moving forward including continuing surveillance imaging, or possible biopsy. I will call her with results and determine her next followup after, but likely in 6 months \par \par Patient verbalized understanding for all treatment plans discussed. All of her questions were answered to the best of my ability. She was encouraged to call the office if any questions or concerns or come in sooner if needed.\par \par \par PLAN\par 1. Bilateral mammogram/US April 2023\par 2. Followup TBD-likely 6 months

## 2023-11-06 ENCOUNTER — APPOINTMENT (OUTPATIENT)
Dept: SURGERY | Facility: CLINIC | Age: 43
End: 2023-11-06
Payer: COMMERCIAL

## 2023-11-06 VITALS
WEIGHT: 145 LBS | BODY MASS INDEX: 20.3 KG/M2 | SYSTOLIC BLOOD PRESSURE: 107 MMHG | OXYGEN SATURATION: 97 % | HEART RATE: 75 BPM | DIASTOLIC BLOOD PRESSURE: 67 MMHG | HEIGHT: 71 IN | TEMPERATURE: 98.4 F

## 2023-11-06 DIAGNOSIS — R92.8 OTHER ABNORMAL AND INCONCLUSIVE FINDINGS ON DIAGNOSTIC IMAGING OF BREAST: ICD-10-CM

## 2023-11-06 PROCEDURE — 99213 OFFICE O/P EST LOW 20 MIN: CPT

## 2023-11-30 ENCOUNTER — APPOINTMENT (OUTPATIENT)
Dept: OBGYN | Facility: CLINIC | Age: 43
End: 2023-11-30
Payer: COMMERCIAL

## 2023-11-30 VITALS
SYSTOLIC BLOOD PRESSURE: 115 MMHG | WEIGHT: 154 LBS | BODY MASS INDEX: 21.56 KG/M2 | DIASTOLIC BLOOD PRESSURE: 77 MMHG | HEART RATE: 83 BPM | HEIGHT: 71 IN

## 2023-11-30 DIAGNOSIS — O09.812 SUPERVISION OF PREGNANCY RESULTING FROM ASSISTED REPRODUCTIVE TECHNOLOGY, SECOND TRIMESTER: ICD-10-CM

## 2023-11-30 DIAGNOSIS — R92.2 INCONCLUSIVE MAMMOGRAM: ICD-10-CM

## 2023-11-30 DIAGNOSIS — Z12.39 ENCOUNTER FOR OTHER SCREENING FOR MALIGNANT NEOPLASM OF BREAST: ICD-10-CM

## 2023-11-30 DIAGNOSIS — O35.9XX0 MATERNAL CARE FOR (SUSPECTED) FETAL ABNORMALITY AND DAMAGE, UNSPECIFIED, NOT APPLICABLE OR UNSPECIFIED: ICD-10-CM

## 2023-11-30 DIAGNOSIS — Z01.419 ENCOUNTER FOR GYNECOLOGICAL EXAMINATION (GENERAL) (ROUTINE) W/OUT ABNORMAL FINDINGS: ICD-10-CM

## 2023-11-30 DIAGNOSIS — R92.30 INCONCLUSIVE MAMMOGRAM: ICD-10-CM

## 2023-11-30 DIAGNOSIS — Z11.51 ENCOUNTER FOR SCREENING FOR HUMAN PAPILLOMAVIRUS (HPV): ICD-10-CM

## 2023-11-30 DIAGNOSIS — Z12.4 ENCOUNTER FOR SCREENING FOR MALIGNANT NEOPLASM OF CERVIX: ICD-10-CM

## 2023-11-30 PROCEDURE — 99396 PREV VISIT EST AGE 40-64: CPT

## 2023-12-07 LAB — HPV HIGH+LOW RISK DNA PNL CVX: NOT DETECTED

## 2024-03-11 ENCOUNTER — APPOINTMENT (OUTPATIENT)
Dept: DERMATOLOGY | Facility: CLINIC | Age: 44
End: 2024-03-11

## 2024-05-13 ENCOUNTER — APPOINTMENT (OUTPATIENT)
Dept: SURGERY | Facility: CLINIC | Age: 44
End: 2024-05-13
Payer: COMMERCIAL

## 2024-05-13 VITALS
WEIGHT: 144.01 LBS | BODY MASS INDEX: 20.16 KG/M2 | SYSTOLIC BLOOD PRESSURE: 127 MMHG | DIASTOLIC BLOOD PRESSURE: 82 MMHG | HEART RATE: 57 BPM | HEIGHT: 71 IN | OXYGEN SATURATION: 100 %

## 2024-05-13 DIAGNOSIS — N60.02 SOLITARY CYST OF LEFT BREAST: ICD-10-CM

## 2024-05-13 DIAGNOSIS — Z12.31 ENCOUNTER FOR SCREENING MAMMOGRAM FOR MALIGNANT NEOPLASM OF BREAST: ICD-10-CM

## 2024-05-13 DIAGNOSIS — N60.01 SOLITARY CYST OF RIGHT BREAST: ICD-10-CM

## 2024-05-13 PROCEDURE — 99213 OFFICE O/P EST LOW 20 MIN: CPT

## 2024-05-13 NOTE — ASSESSMENT
[FreeTextEntry1] : 43 year old female with bilateral breast cysts/nodule, undergoing imaging surveillance. Right breast cysts and nodules stable on last US 10/21/23.   CBE is benign. Reviewed recent imaging with patient from april - benign. Due for next screening MMG/US in april 2025.   Follow up in one year   Patient verbalized understanding of all treatment plans. All of her questions were answered to the best of my ability. She was encouraged to call the office sooner for any questions or concerns.   Plan 1. Screening MMG/US April '25 2. Follow up in 1 year

## 2024-05-13 NOTE — PAST MEDICAL HISTORY
[Menstruating] : The patient is menstruating [Menarche Age ____] : age at menarche was [unfilled] [History of Hormone Replacement Treatment] : has no history of hormone replacement treatment [Definite ___ (Date)] : the last menstrual period was [unfilled] [Normal Amount/Duration] : it was of a normal amount and duration [Total Preg ___] : G[unfilled] [Live Births ___] : P[unfilled]  [Living ___] : Living: [unfilled] [Age At Live Birth ___] : Age at live birth: [unfilled] [FreeTextEntry5] : No. [FreeTextEntry6] : Yes, patient used IVF. [FreeTextEntry7] : Yes, for 12 years. [FreeTextEntry8] : No.

## 2024-05-13 NOTE — PHYSICAL EXAM
[Normocephalic] : normocephalic [Atraumatic] : atraumatic [EOMI] : extra ocular movement intact [PERRL] : pupils equal, round and reactive to light [Sclera nonicteric] : sclera nonicteric [Conjunctiva pink] : conjunctiva pink [Supple] : supple [No Supraclavicular Adenopathy] : no supraclavicular adenopathy [No Cervical Adenopathy] : no cervical adenopathy [Examined in the supine and seated position] : examined in the supine and seated position [Symmetrical] : symmetrical [Bra Size: ___] : Bra Size: [unfilled] [No dominant masses] : no dominant masses in right breast  [No dominant masses] : no dominant masses left breast [No Nipple Retraction] : no left nipple retraction [No Nipple Discharge] : no left nipple discharge [No Axillary Lymphadenopathy] : no left axillary lymphadenopathy [No Edema] : no edema [No Rashes] : no rashes [No Ulceration] : no ulceration

## 2024-05-13 NOTE — REVIEW OF SYSTEMS
[Breast Pain] : no breast pain [Breast Lump] : no breast lump [Nipple Discharge] : no nipple discharge [Negative] : Heme/Lymph

## 2024-05-13 NOTE — HISTORY OF PRESENT ILLNESS
[FreeTextEntry1] : Problem List 1. Bilateral breast cysts     -Right 5:00 6cm FN fibrocystic nodule, smaller in size    -Right 6:00 1 cm FN hypoechoic nodule stable     -Right 6:00 3cm FN septated cyst stable        CARE TEAM GYN - [place]  Interval History:  (23): Patient presents for follow up. Denies any breast complaints.   (24): Patient presents for follow up, denies any breast complaints.   HPI:  - Patient is a 42-year-old female who presents for an initial consultation for abnormal breast imaging. She went for routine screening mammogram/US in May 2022, which demonstrated bilateral breast cysts, for which surveillance imaging was recommended. On repeat imaging, the right breast 5:00 6cm cluster of cysts increased in size.    [id number] - [facility]: Screening Mammogram - IMPRESSION: No mammographic evidence of malignancy. Recommend routine screening. - BIRADS 2: Benign Finding(s).  22 - [facility]: Complete Bilateral Breast Ultrasound - Multiple bilateral breast cysts Including a left breast 10:00 10cm FN 5mm cyst, which is stable in size, but short term followup is reocmmended. IMPRESSION: Stable probably benign finding left breast 10:00. Short-term interval follow-up is recommended in 6 months time to document stability. - BIRADS 3: Probably Benign.  22 - [facility]: Complete Bilateral Breast Ultrasound - Right breast 5:00 6cm FN, 1.3cm fibrocystic nodule, increased in size. IMPRESSION: Right breast cystic nodules. No sonographic evidence of malignancy. - BIRADS 2: Benign Finding(s). Recommend US in 1 year   23: [facility]: Diagnostic Mammogram Bilateral and Ultrasound Bilateral: Density C, Impression - No mammographic evidence of malignancy. US - cystic nodules right breast. Right breast 5:00 6 cm FN, 1.1 cm slightly smaller, 6:00 3 cm FN 0. 7 cm septated cyst slightly larger, 6:00 1 cm FN benign appearing hypoechoic nodule vs complicated cyst 0. 6 cm not previously seen. Increasing left fibrocystic tissue. BI-RADS 3   10/21/23: [facility]PR: Breast Ultrasound Bilateral Complete: Impression -Bilateral cysts and right breast nodules with benign features stable in size. No suspicious findings. Follow up sonogram in conjunction with patient's annual screening mammogram is recommended. BI-RADS 2

## 2024-09-30 ENCOUNTER — NON-APPOINTMENT (OUTPATIENT)
Age: 44
End: 2024-09-30

## 2024-10-02 ENCOUNTER — EMERGENCY (EMERGENCY)
Facility: HOSPITAL | Age: 44
LOS: 1 days | Discharge: HOME | End: 2024-10-02
Payer: COMMERCIAL

## 2024-10-02 VITALS
HEIGHT: 71 IN | HEART RATE: 109 BPM | WEIGHT: 148.81 LBS | RESPIRATION RATE: 18 BRPM | SYSTOLIC BLOOD PRESSURE: 118 MMHG | TEMPERATURE: 101 F | OXYGEN SATURATION: 96 % | DIASTOLIC BLOOD PRESSURE: 78 MMHG

## 2024-10-02 PROCEDURE — L9991: CPT

## 2024-10-02 NOTE — ED ADULT TRIAGE NOTE - CHIEF COMPLAINT QUOTE
Ambulatory to ED c/o  cough, fever, body aches for 4 days.  denies abd pain, nvd, chills.  last took nyquil 1 hour ago

## 2024-10-05 DIAGNOSIS — Z53.21 PROCEDURE AND TREATMENT NOT CARRIED OUT DUE TO PATIENT LEAVING PRIOR TO BEING SEEN BY HEALTH CARE PROVIDER: ICD-10-CM

## 2024-10-05 DIAGNOSIS — R05.9 COUGH, UNSPECIFIED: ICD-10-CM

## 2024-10-05 DIAGNOSIS — R50.9 FEVER, UNSPECIFIED: ICD-10-CM

## 2024-12-02 ENCOUNTER — APPOINTMENT (OUTPATIENT)
Dept: OBGYN | Facility: CLINIC | Age: 44
End: 2024-12-02
Payer: COMMERCIAL

## 2024-12-02 VITALS
HEIGHT: 71 IN | DIASTOLIC BLOOD PRESSURE: 74 MMHG | HEART RATE: 87 BPM | RESPIRATION RATE: 16 BRPM | WEIGHT: 149 LBS | SYSTOLIC BLOOD PRESSURE: 124 MMHG | BODY MASS INDEX: 20.86 KG/M2

## 2024-12-02 DIAGNOSIS — Z01.419 ENCOUNTER FOR GYNECOLOGICAL EXAMINATION (GENERAL) (ROUTINE) W/OUT ABNORMAL FINDINGS: ICD-10-CM

## 2024-12-02 PROCEDURE — 99396 PREV VISIT EST AGE 40-64: CPT

## 2024-12-02 PROCEDURE — 99459 PELVIC EXAMINATION: CPT

## 2024-12-09 LAB — HPV HIGH+LOW RISK DNA PNL CVX: NOT DETECTED

## 2025-04-01 ENCOUNTER — TRANSCRIPTION ENCOUNTER (OUTPATIENT)
Age: 45
End: 2025-04-01

## 2025-04-01 ENCOUNTER — OUTPATIENT (OUTPATIENT)
Dept: OUTPATIENT SERVICES | Facility: HOSPITAL | Age: 45
LOS: 1 days | End: 2025-04-01
Payer: COMMERCIAL

## 2025-04-01 VITALS
OXYGEN SATURATION: 100 % | SYSTOLIC BLOOD PRESSURE: 132 MMHG | HEART RATE: 60 BPM | DIASTOLIC BLOOD PRESSURE: 76 MMHG | TEMPERATURE: 98 F | RESPIRATION RATE: 16 BRPM

## 2025-04-01 VITALS
HEART RATE: 63 BPM | SYSTOLIC BLOOD PRESSURE: 102 MMHG | RESPIRATION RATE: 16 BRPM | DIASTOLIC BLOOD PRESSURE: 53 MMHG | OXYGEN SATURATION: 95 %

## 2025-04-01 DIAGNOSIS — N62 HYPERTROPHY OF BREAST: ICD-10-CM

## 2025-04-01 DIAGNOSIS — M54.6 PAIN IN THORACIC SPINE: ICD-10-CM

## 2025-04-01 DIAGNOSIS — M54.2 CERVICALGIA: ICD-10-CM

## 2025-04-01 LAB — HCG UR QL: NEGATIVE — SIGNIFICANT CHANGE UP

## 2025-04-01 PROCEDURE — 19318 BREAST REDUCTION: CPT | Mod: 50

## 2025-04-01 PROCEDURE — 88305 TISSUE EXAM BY PATHOLOGIST: CPT | Mod: 26

## 2025-04-01 PROCEDURE — 81025 URINE PREGNANCY TEST: CPT

## 2025-04-01 PROCEDURE — C9399: CPT

## 2025-04-01 PROCEDURE — 88305 TISSUE EXAM BY PATHOLOGIST: CPT

## 2025-04-01 RX ORDER — TRANEXAMIC ACID 1000 MG/10
1000 AMPUL (ML) INTRAVENOUS ONCE
Refills: 0 | Status: DISCONTINUED | OUTPATIENT
Start: 2025-04-01 | End: 2025-04-01

## 2025-04-01 RX ORDER — CEFAZOLIN SODIUM IN 0.9 % NACL 3 G/100 ML
2000 INTRAVENOUS SOLUTION, PIGGYBACK (ML) INTRAVENOUS ONCE
Refills: 0 | Status: COMPLETED | OUTPATIENT
Start: 2025-04-01 | End: 2025-04-01

## 2025-04-01 RX ORDER — METOCLOPRAMIDE HCL 10 MG
5 TABLET ORAL ONCE
Refills: 0 | Status: COMPLETED | OUTPATIENT
Start: 2025-04-01 | End: 2025-04-01

## 2025-04-01 RX ORDER — OXYCODONE HYDROCHLORIDE 30 MG/1
5 TABLET ORAL ONCE
Refills: 0 | Status: DISCONTINUED | OUTPATIENT
Start: 2025-04-01 | End: 2025-04-01

## 2025-04-01 RX ORDER — SODIUM CHLORIDE 9 G/1000ML
1000 INJECTION, SOLUTION INTRAVENOUS
Refills: 0 | Status: DISCONTINUED | OUTPATIENT
Start: 2025-04-01 | End: 2025-04-01

## 2025-04-01 RX ORDER — HYDROMORPHONE/SOD CHLOR,ISO/PF 2 MG/10 ML
0.5 SYRINGE (ML) INJECTION
Refills: 0 | Status: DISCONTINUED | OUTPATIENT
Start: 2025-04-01 | End: 2025-04-01

## 2025-04-01 RX ADMIN — Medication 0.5 MILLIGRAM(S): at 15:02

## 2025-04-01 RX ADMIN — Medication 0.5 MILLIGRAM(S): at 15:17

## 2025-04-01 RX ADMIN — Medication 5 MILLIGRAM(S): at 16:19

## 2025-04-01 RX ADMIN — SODIUM CHLORIDE 50 MILLILITER(S): 9 INJECTION, SOLUTION INTRAVENOUS at 10:46

## 2025-04-01 RX ADMIN — SODIUM CHLORIDE 110 MILLILITER(S): 9 INJECTION, SOLUTION INTRAVENOUS at 15:02

## 2025-04-01 NOTE — ASU DISCHARGE PLAN (ADULT/PEDIATRIC) - COMMENTS
[facility]you received Tylenol at 2pm in the OR...may alternate with Motrin/Advil for pain. Take with food.

## 2025-04-01 NOTE — ASU DISCHARGE PLAN (ADULT/PEDIATRIC) - FINANCIAL ASSISTANCE
[facility] provides services at a reduced cost to those who are determined to be eligible through [facility]’s financial assistance program. Information regarding [facility]’s financial assistance program can be found by going to [email or web address] or by calling [phone].

## 2025-04-01 NOTE — ASU DISCHARGE PLAN (ADULT/PEDIATRIC) - ASU DC SPECIAL INSTRUCTIONSFT
Please keep surgical bra on, you may remove 2 days after surgery to shower, reapply bra afterwards and wear at all times. Leave dressings on the skin in place.  You may start taking your antibiotic tonight with food.   Walk often to prevent blood clots.  You may alternate tylenol and motrin for pain control.   Follow up in the office in 1 week.  Please call [phone] for any questions.

## 2025-05-12 ENCOUNTER — APPOINTMENT (OUTPATIENT)
Dept: SURGERY | Facility: CLINIC | Age: 45
End: 2025-05-12

## (undated) DEVICE — VENODYNE/SCD SLEEVE CALF LARGE

## (undated) DEVICE — GLV 7 PROTEXIS (WHITE)

## (undated) DEVICE — VENODYNE/SCD SLEEVE CALF MEDIUM

## (undated) DEVICE — DRSG BIOPATCH DISK W CHG 1" W 7.0MM HOLE

## (undated) DEVICE — WARMING BLANKET LOWER ADULT

## (undated) DEVICE — SOL IRR POUR NS 0.9% 1000ML

## (undated) DEVICE — DRSG MASTISOL

## (undated) DEVICE — SOL IRR POUR H2O 1000ML

## (undated) DEVICE — MARKING PEN W RULER

## (undated) DEVICE — SUT SURGIPRO 5-0 18" P-12

## (undated) DEVICE — PROTECTOR HEEL / ELBOW FLUFFY

## (undated) DEVICE — DRAPE 3/4 SHEET W REINFORCEMENT 56X77"

## (undated) DEVICE — BLADE SCALPEL SAFETYLOCK #10

## (undated) DEVICE — DRSG KERLIX ROLL LG 4.5"

## (undated) DEVICE — ELCTR BOVIE TIP BLADE INSULATED 2.75" EDGE

## (undated) DEVICE — Device

## (undated) DEVICE — DRAPE SPLIT SHEET 77" X 108"

## (undated) DEVICE — TUBING SINGLE SPIKE INFILTRTION 15.5 FT

## (undated) DEVICE — DRAPE INSTRUMENT POUCH 6.75" X 11"

## (undated) DEVICE — SUT MONOCRYL 3-0 18" PS-2 UNDYED

## (undated) DEVICE — DRAPE LEGGINGS 6" CUFF 28X43"

## (undated) DEVICE — SUT POLYSORB 3-0 18" P-12 UNDYED

## (undated) DEVICE — DRSG STERISTRIPS 0.5 X 4"

## (undated) DEVICE — PLV/PSP-ESU VALLEYLAB T7E14761DX: Type: DURABLE MEDICAL EQUIPMENT

## (undated) DEVICE — STAPLER SKIN VISI-STAT 35 WIDE

## (undated) DEVICE — NDL HYPO SAFE 25G X 1.5" (ORANGE)

## (undated) DEVICE — PLASTIC SOLUTION BOWL 160Z

## (undated) DEVICE — DRSG COMBINE 5 X 9"

## (undated) DEVICE — NDL COUNTER FOAM AND MAGNET 20-40

## (undated) DEVICE — GLV 7 PROTEXIS (BLUE)

## (undated) DEVICE — SUT MONOCRYL 4-0 27" PS-2 UNDYED

## (undated) DEVICE — PACK MAJOR ABDOMINAL WITH LAP

## (undated) DEVICE — PLV-SCD MACHINE: Type: DURABLE MEDICAL EQUIPMENT

## (undated) DEVICE — SAFETY PIN 1.5" MED

## (undated) DEVICE — SUT POLYSORB 2-0 30" GS-21 UNDYED

## (undated) DEVICE — WARMING BLANKET FULL UNDERBODY

## (undated) DEVICE — SUT SOFSILK 2-0 18" C-15

## (undated) DEVICE — DRAIN RESERVOIR FOR JACKSON PRATT 100CC CARDINAL

## (undated) DEVICE — BLADE SCALPEL SAFETYLOCK #15

## (undated) DEVICE — ELCTR BOVIE PENCIL SMOKE EVACUATION